# Patient Record
Sex: FEMALE | Race: WHITE | NOT HISPANIC OR LATINO | Employment: FULL TIME | URBAN - NONMETROPOLITAN AREA
[De-identification: names, ages, dates, MRNs, and addresses within clinical notes are randomized per-mention and may not be internally consistent; named-entity substitution may affect disease eponyms.]

---

## 2018-09-28 ENCOUNTER — HOSPITAL ENCOUNTER (EMERGENCY)
Facility: HOSPITAL | Age: 38
Discharge: HOME | End: 2018-09-28
Attending: STUDENT IN AN ORGANIZED HEALTH CARE EDUCATION/TRAINING PROGRAM | Admitting: STUDENT IN AN ORGANIZED HEALTH CARE EDUCATION/TRAINING PROGRAM

## 2018-09-28 VITALS
HEIGHT: 64 IN | WEIGHT: 215 LBS | BODY MASS INDEX: 36.7 KG/M2 | SYSTOLIC BLOOD PRESSURE: 144 MMHG | DIASTOLIC BLOOD PRESSURE: 92 MMHG | HEART RATE: 84 BPM | OXYGEN SATURATION: 97 % | RESPIRATION RATE: 18 BRPM | TEMPERATURE: 97.9 F

## 2018-09-28 DIAGNOSIS — M54.41 ACUTE RIGHT-SIDED BACK PAIN WITH SCIATICA: Primary | ICD-10-CM

## 2018-09-28 PROCEDURE — 99283 EMERGENCY DEPT VISIT LOW MDM: CPT

## 2018-09-28 PROCEDURE — 63710000001 PREDNISONE PER 5 MG: Performed by: PHYSICIAN ASSISTANT

## 2018-09-28 RX ORDER — TRAMADOL HYDROCHLORIDE 50 MG/1
50 TABLET ORAL EVERY 6 HOURS PRN
Qty: 12 TABLET | Refills: 0 | OUTPATIENT
Start: 2018-09-28 | End: 2021-08-27

## 2018-09-28 RX ORDER — METHOCARBAMOL 750 MG/1
750 TABLET, FILM COATED ORAL 3 TIMES DAILY PRN
Qty: 30 TABLET | Refills: 0 | Status: SHIPPED | OUTPATIENT
Start: 2018-09-28 | End: 2021-08-27 | Stop reason: SDUPTHER

## 2018-09-28 RX ORDER — PREDNISONE 20 MG/1
20 TABLET ORAL
Qty: 15 TABLET | Refills: 0 | Status: SHIPPED | OUTPATIENT
Start: 2018-09-28 | End: 2021-08-27 | Stop reason: SDUPTHER

## 2018-09-28 RX ADMIN — PREDNISONE 60 MG: 50 TABLET ORAL at 18:27

## 2021-12-07 ENCOUNTER — TRANSCRIBE ORDERS (OUTPATIENT)
Dept: ADMINISTRATIVE | Facility: HOSPITAL | Age: 41
End: 2021-12-07

## 2021-12-07 DIAGNOSIS — Z12.31 VISIT FOR SCREENING MAMMOGRAM: Primary | ICD-10-CM

## 2022-01-17 ENCOUNTER — APPOINTMENT (OUTPATIENT)
Dept: MAMMOGRAPHY | Facility: HOSPITAL | Age: 42
End: 2022-01-17

## 2022-02-28 ENCOUNTER — APPOINTMENT (OUTPATIENT)
Dept: MAMMOGRAPHY | Facility: HOSPITAL | Age: 42
End: 2022-02-28

## 2022-04-18 ENCOUNTER — HOSPITAL ENCOUNTER (OUTPATIENT)
Dept: MAMMOGRAPHY | Facility: HOSPITAL | Age: 42
Discharge: HOME | End: 2022-04-18
Admitting: NURSE PRACTITIONER

## 2022-04-18 ENCOUNTER — TRANSCRIBE ORDERS (OUTPATIENT)
Dept: ADMINISTRATIVE | Facility: HOSPITAL | Age: 42
End: 2022-04-18

## 2022-04-18 DIAGNOSIS — R92.8 ABNORMAL MAMMOGRAM: Primary | ICD-10-CM

## 2022-04-18 DIAGNOSIS — Z12.31 VISIT FOR SCREENING MAMMOGRAM: ICD-10-CM

## 2022-04-18 PROCEDURE — 77063 BREAST TOMOSYNTHESIS BI: CPT

## 2022-04-18 PROCEDURE — 77067 SCR MAMMO BI INCL CAD: CPT

## 2022-05-04 ENCOUNTER — HOSPITAL ENCOUNTER (OUTPATIENT)
Dept: MAMMOGRAPHY | Facility: HOSPITAL | Age: 42
Discharge: HOME | End: 2022-05-04

## 2022-05-04 ENCOUNTER — HOSPITAL ENCOUNTER (OUTPATIENT)
Dept: ULTRASOUND IMAGING | Facility: HOSPITAL | Age: 42
Discharge: HOME | End: 2022-05-04

## 2022-05-04 DIAGNOSIS — R92.8 ABNORMAL MAMMOGRAM: ICD-10-CM

## 2022-05-04 PROCEDURE — G0279 TOMOSYNTHESIS, MAMMO: HCPCS

## 2022-05-04 PROCEDURE — 77065 DX MAMMO INCL CAD UNI: CPT

## 2023-01-31 ENCOUNTER — TELEPHONE (OUTPATIENT)
Dept: OBSTETRICS AND GYNECOLOGY | Facility: CLINIC | Age: 43
End: 2023-01-31

## 2023-01-31 NOTE — TELEPHONE ENCOUNTER
Caller: Sunni Toure    Relationship to patient: Self    Best call back number: [phone]    Patient is needing: PT UNABLE TO MAKE APPOINTMENT DUE TO WEATHER, R/S TO 3-20

## 2023-03-20 ENCOUNTER — TELEPHONE (OUTPATIENT)
Dept: OBSTETRICS AND GYNECOLOGY | Facility: CLINIC | Age: 43
End: 2023-03-20